# Patient Record
(demographics unavailable — no encounter records)

---

## 2024-10-08 NOTE — ASSESSMENT
[FreeTextEntry1] : MILTON ANDERSON is a 46 year female who presents today Oct 08, 2024 for the following issues:   HTN - non compliant with medication. Discussed potential consequence of untreated like pressure like heart attack or stroke.  - Must resume Nifedipine ER 60mg and HCTXZ 25 - Continue follow up with cardiology  Upper abdomen pain that is relived by eating foods - Possible duodenal ulcer ? - will r/o HPLORI  - start PPI . Stop any NSAIDS  Elevated CRP/ESR - RA and GELA negative - will repeat. If still elevated then refer to rheum

## 2024-10-08 NOTE — HISTORY OF PRESENT ILLNESS
[FreeTextEntry1] : pt presents for follow up [de-identified] : MILTON ANDERSON is a 46 year female who presents today Oct 08, 2024 for follow up.   She has been experiencing upper abdomen x 3-4 weeks. She get pain in morning or when hungry. Pain is relieved by food.  She has not been taking her blood pressure medication. Last pill taken was last week.  Has a lot of general body pain (back, feets, knees)  No recent fevers.

## 2024-10-08 NOTE — REVIEW OF SYSTEMS
[Fever] : no fever [Chills] : no chills [Fatigue] : fatigue [Chest Pain] : no chest pain [Palpitations] : no palpitations [Shortness Of Breath] : no shortness of breath [Wheezing] : no wheezing [Cough] : no cough [Abdominal Pain] : abdominal pain [Nausea] : no nausea [Constipation] : no constipation [Joint Pain] : joint pain [Back Pain] : back pain

## 2024-12-23 NOTE — PHYSICAL EXAM
[TextEntry] : GENERAL: Appears in no acute distress  HEENT: EOMI, PERRLA. No conjunctival erythema. Moist mucous membranes. No nasopharyngeal ulcers  NECK: Supple, no cervical lymphadenopathy, no thyromegaly  CARDIOVASCULAR: RRR. S1, S2 auscultated. No murmurs or rubs.  PULMONARY: Clear to auscultation b/l, no wheezes, rales, or crackles  ABDOMINAL: Soft, nontender, nondistended. Bowel sounds present. No organomegaly.  MSK:  No active synovitis, swelling, erythema, or warmth.  No joint tenderness to palpation.  No deformities.  SKIN: No lesions or rashes  NEURO: No focal deficits.  PSYCH: AAOx3. Normal affect and thought process.

## 2024-12-23 NOTE — HISTORY OF PRESENT ILLNESS
[FreeTextEntry1] : 47 y/o female w/ HTN presents as follow up for chronic fatigue and elevated ESR. Pt reports overall pains all over the body and fatigue x 2 years. Pt reports pains in the lower back. Pt had Hx of L knee meniscus repair surgery, pt advised she needs b/l meniscus repair surgery as well. Pt takes Tylenol PRN for the pains. Pt avoids NSAIDs due to HTN. 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 Pt was seen by me in 2023 for body pains and fatigue - advised on evaluation by neuro for hand and foot neuropathy symptoms with negative workup.  Pt referred back to rheumatology for elevated inflammatory markres. Normal/neg GELA, RF, Tick-borne diseases in 7/2024.  Pt reports recurrent fevers ~3/2024 - 7/2024 with resolution of fever after breast implant replacement due to rupture. Pt was seen by hem/onc for lymph node swelling with workup without any concerning findings.  WORKUP: ESR 44 (7/2024) -> 51 (10/2024) CRP 20 (7/2024) -> negative (10/2024)

## 2024-12-23 NOTE — ASSESSMENT
[FreeTextEntry1] : 47 y/o female w/ HTN presents as follow up for chronic fatigue and elevated ESR. Pt reports overall pains all over the body and fatigue x 2 years. Pt reports pains in the lower back. Pt had Hx of L knee meniscus repair surgery, pt advised she needs b/l meniscus repair surgery as well. Pt takes Tylenol PRN for the pains. Pt avoids NSAIDs due to HTN. 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 Pt was seen by me in 2023 for body pains and fatigue - advised on evaluation by neuro for hand and foot neuropathy symptoms with negative workup.  Pt referred back to rheumatology for elevated inflammatory markers. Normal/neg GELA, RF, Tick-borne diseases in 7/2024.  Pt reports recurrent fevers ~3/2024 - 7/2024 with resolution of fever after breast implant replacement due to rupture. Pt was seen by hem/onc for lymph node swelling with workup without any concerning findings.  Patient has elevated ESR, which is a non-specific marker that can be seen in systemic inflammation. However, this is not specific to systemic autoimmune diseases. Pt's body aches and fatigue are chronic, non-articular, without signs of inflammatory arthritis. GELA and RF are already negative. I can complete workup for systemic autoimmune diseases but I have low suspicion.  There are numerous causes of ESR elevation. It is possible that pt may be having inflammatory reaction (pathologic or not) to breast implants.  - Obtain labwork to complete workup for signs of systemic autoimmune diseases. - Rx meloxicam 7.5mg PO daily PRN. I advised that the NSAID should be taken with food.  In addition while taking the prescribed NSAID, no over the counter or other NSAIDs should be used, such as ibuprofen (Motrin or Advil) or naproxen (Aleve) as this can cause stomach upset or other side effects.  If needed for fever or breakthrough pain Tylenol can be used. - Patient advised on stress reduction, sleep hygiene, physical activity and exercise (including yoga and [place]), treatment of stresses, cognitive behavioral therapy. - Will contact pt with results of above workup. If unremarkable, pt does not need to follow up regularly with rheumatology. RTC PRN.

## 2024-12-23 NOTE — HISTORY OF PRESENT ILLNESS
[FreeTextEntry1] : 45 y/o female w/ HTN presents as follow up for chronic fatigue and elevated ESR. Pt reports overall pains all over the body and fatigue x 2 years. Pt reports pains in the lower back. Pt had Hx of L knee meniscus repair surgery, pt advised she needs b/l meniscus repair surgery as well. Pt takes Tylenol PRN for the pains. Pt avoids NSAIDs due to HTN. 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 Pt was seen by me in 2023 for body pains and fatigue - advised on evaluation by neuro for hand and foot neuropathy symptoms with negative workup.  Pt referred back to rheumatology for elevated inflammatory markres. Normal/neg GELA, RF, Tick-borne diseases in 7/2024.  Pt reports recurrent fevers ~3/2024 - 7/2024 with resolution of fever after breast implant replacement due to rupture. Pt was seen by hem/onc for lymph node swelling with workup without any concerning findings.  WORKUP: ESR 44 (7/2024) -> 51 (10/2024) CRP 20 (7/2024) -> negative (10/2024)

## 2025-01-16 NOTE — HISTORY OF PRESENT ILLNESS
[FreeTextEntry1] : Ms. MILTON ANDERSON is a 46 year old female with past medical history of bilateral breast implant capsular contracture who is now s/p implant removal, capsulectomy, and re-insertion of breast implants. Her most recent breast US was done 11/1/24. Results returned with "suspicion of right breast implant rupture." She then had an MRI completed with pre and post IV contrast 12/12/24. Results showed "right breast silicone breast implant intact with minimal lb-implant fluid noted. Left breast with intact breast implant and minimal lb-implant fluid."  She presents today for evaluation of her ongoing left breast pain. She explains that is keeps on improving as time goes on.

## 2025-01-16 NOTE — PHYSICAL EXAM
[NI] : Normal [de-identified] : bilateral incisions c/d/i soft and symmetrical implants in correct positioning  nipples viable no signs of infection noted [de-identified] : incisions c/d/i no signs of infection noted

## 2025-01-16 NOTE — ASSESSMENT
[FreeTextEntry1] : 47 yo female s/p b/l breast implant removal, capsulectomy, re-insertion of implants 7/3/24  Patient with left breast pain that has been improving. Her imaging results do not demonstrate implant rupture. Her exam is without any concerning findings. No surgical intervention is warranted at this time  Discussed continuing to perform scar massages RTO prn all pt questions answered.

## 2025-03-10 NOTE — PHYSICAL EXAM
[Normal] : normal rate, regular rhythm, normal S1 and S2 and no murmur heard [de-identified] : + mild epigastric tenderness

## 2025-03-10 NOTE — ASSESSMENT
[FreeTextEntry1] : 46 YOF with moderate ERNA, HTN, pre DM,   BMI 30 here today for the following issues  Epigastric pain x 5 months - No improvement with PPI - Yonatan order CT scan of Abdomen/Pelvis - Will refer to Gastroenterology   HTN - Uncontrolled and poor medication compliance. Advised the importance of taking medication very day and not skipping any doses.  - Continue Nifepeine ER 60mg and HCTZ 25mg   ERNA - Last sleep study Aug 2023: Moderate ERNA - Will refer to pulmonology for CPAP  Joint pain - Advised that taking Meloxicam can increase blood pressure and increase risk of bleeding ulcers. Advised to stop Meloxicam and take Tylenol only.   Obesity - BMI 30. Was prescribed Wegovy, but stopped taking it. Advised importance of high protein, high fiber, low fat, low carb diet. Increase walking. Implement 30 mins of exercise 5 times per week.   Pre DM - Advised diet, exercise, and weight loss  Thyroid nodule? Will order US thyroid    Follow up in 3-4 months

## 2025-03-10 NOTE — HISTORY OF PRESENT ILLNESS
[FreeTextEntry1] : Pt presents for a f/u from gastroenterology.  [de-identified] : 46 YOF with moderate ERNA, HTN, pre DM,   BMI 30 here today with complaints of burning epigastric pain x 5 months, and constipation x 5/6 days, but did have with small BM this morning.   She takes a laxative tea. She did a fleet anemia. She does not like to take MiraLAX because it makes her feel worse, and states it doesn't work. She was taking Omeprazole 20 mg, but stopped it a few weeks ago because it was not working. Patient states she has not been taking her Nifedipine, because she thought it was discontinued? And she only takes the HCTZ maybe 1-2 times a week. She was dx with moderate ERNA, but has not followed up with pulmonary for CPAP machine. She has very poor sleep habits. She works as overnight nurse. She states sometimes she only sleeps 2-3 hours during the day. On her days off from work .she will sleep 8 hours but does not feel deep sleep. She stopped Wegovy. She takes Meloxicam everyday, but states the epigastric pain started before she started Meloxicam.

## 2025-03-26 NOTE — DISCUSSION/SUMMARY
[EKG obtained to assist in diagnosis and management of assessed problem(s)] : EKG obtained to assist in diagnosis and management of assessed problem(s) [FreeTextEntry1] : Pt is a 47 y/o F PMH HTN, HLD, preDM, BMI 30.    TTE 09/2020 EF 59% without significant valvular disease ETT 09/2020 good exercise tolerance, no ischemic changes  HTN: improving c/w HCTZ 25mg qd c/w nifedipine 60mg qd Advised low salt diet, regular exercise, weight loss  HLD: Advised lifestyle modifications   preDM: Advise lifestyle modifications   Pt will return in 6-12 mnths or sooner as needed but I encouraged communication via phone or portal if necessary. The described plan was discussed with the pt.  All questions and concerns were addressed to the best of my knowledge.

## 2025-03-26 NOTE — HISTORY OF PRESENT ILLNESS
[FreeTextEntry1] : Pt is a 47 y/o F who presents today for f/u.  Pt has PMH HTN, preDM, BMI 30 family hx: mother MI 40 's, father "cardiac problems".    Pt reports feeling well and has no active cardiac complaints - denies CP, SOB, palpitations, dizziness, syncope, edema, orthopnea, PND, orthopnea.  No exertional symptoms.  She tells me that she recently stopped antiHTN but has restarted  TTE 09/2020 EF 59% without significant valvular disease ETT 09/2020 good exercise tolerance, no ischemic changes Home sleep study 10/2020 mild ERNA  Smoking status: never  social ETOH no drug use Current exercise: none Daily water intake:  "not much" Daily caffeine intake: 1 cup coffee OTC medications: none Family hx: mother MI 40 's, father "cardiac problems" Previous hospitalizations: never 3 children - GDM

## 2025-04-14 NOTE — PROCEDURE
[FreeTextEntry1] : Pulmonary function test 4/14/2025 mild restrictive disease possibly secondary obesity.  Mild obstructive small airways with a bronchodilator effect.

## 2025-04-14 NOTE — DISCUSSION/SUMMARY
[FreeTextEntry1] : Ms. Washington has a history of obstructive sleep apnea and she never went to start treatment.  She is having continued symptoms of witnessed apneas with significant daytime hypersomnolence.  She does work overnight shifts which makes it difficult to have a normal sleep pattern.  I recommend a home sleep study.  This been explained to the patient and based on these results further recommendations of will be made.  Patient agrees with the plan of care. The patient understands and agrees with plan of care. Today's office visit encompassed 46 minutes. I conducted an extensive history,physical exam and reviewed diagnosis and treatment options including diagnostic tests,radiology studies including cat scans and the use of prescription medication.

## 2025-04-14 NOTE — REASON FOR VISIT
[Initial] : an initial visit [Sleep Evaluation] : sleep evaluation [Sleep Apnea] : sleep apnea [TextEntry] : Patient here to establish care.  She was diagnosed with sleep apnea a few years ago.  The latest sleep study she had is from 2023.  Patient does not use a CPAP.  She is struggling with her sleep, the quality is poor, she is snoring, waking up gasping for air, she feels like she is choking in her sleep.

## 2025-04-14 NOTE — HISTORY OF PRESENT ILLNESS
[Never] : never [TextBox_4] : 46 female no hx tobacco  dx obstructive sleep apnea 2023  on home study and never machine Today presents bec PCP rec evaluation +snore +gasping choking and witness apnea +thrashing sl am headache and dry mouth feels tired dbut work overnite  1100pm-800 am  at [employer] [facility] no sob no cough no wheeze no chest pain no tightness

## 2025-05-16 NOTE — HISTORY OF PRESENT ILLNESS
[Never] : never [TextBox_4] : 46-year-old female presents today for follow-up of her sleep study.  Patient works the night shift but is often tired.  She tries to sleep during the day she often has thrashing about and always is tired.

## 2025-05-16 NOTE — REASON FOR VISIT
[Follow-Up] : a follow-up visit [Sleep Evaluation] : sleep evaluation [Hypersomnolence] : hypersomnolence [TextEntry] : Patient here for sleep study results. No pulmonary compliants.

## 2025-05-16 NOTE — PROCEDURE
[FreeTextEntry1] : Home sleep study 5/1/2025 AHI 14.9 wu O2 sat 73%. 151 events. This consistent with mild to moderate obstructive sleep apnea. Will discuss with patient next visit.

## 2025-05-16 NOTE — HISTORY OF PRESENT ILLNESS
[FreeTextEntry1] : Pt is a 47 y/o F who presents today for f/u.  Pt has PMH HTN, preDM, BMI 30 family hx: mother MI 40 's, father "cardiac problems".    She was seen today at pulmonologists office and HR was found to be in the 40's - pt felt some palpitations.  She denies CP, SOB, diaphoresis, dizziness, syncope, LE edema, PND, orthopnea.  Home 's/70's  TTE 09/2020 EF 59% without significant valvular disease ETT 09/2020 good exercise tolerance, no ischemic changes Home sleep study 10/2020 mild ERNA  Smoking status: never  social ETOH no drug use Current exercise: none Daily water intake:  "not much" Daily caffeine intake: 1 cup coffee OTC medications: none Family hx: mother MI 40 's, father "cardiac problems" Previous hospitalizations: never 3 children - GDM

## 2025-05-16 NOTE — DISCUSSION/SUMMARY
[FreeTextEntry1] : Felix has mild to moderate obstructive sleep apnea.  The patient has significant symptoms and I favor positive pressure breathing this been thorough explained to the patient she understands and agrees. discussed obstructive sleep apnea at length Pt advised weight loss Pt instructed not to use alcohol with in 4 hrs of sleep Pt instructed not to drive or use machinery if very tired The patient must use the APAP machine 70% of nights for at least 4 hours a night to be effective. They must use distilled water in humidifier. We will be checking patient compliance on their next visit will initiate apap therapy Patient understands and all questions answered  The patient has an irregular heartbeat.  Upon examination it seems like she has couple beats.  She is asymptomatic.  We spoke to her cardiologist who is going to evaluate her in the office now. The patient understands and agrees with the plan of care. Today's office visit encompassed 42 minutes. I conducted an extensive history, physical exam and reviewed diagnosis and treatment options including diagnostic tests radiology studies including cat scans and the use of prescription medication.

## 2025-05-16 NOTE — DISCUSSION/SUMMARY
[EKG obtained to assist in diagnosis and management of assessed problem(s)] : EKG obtained to assist in diagnosis and management of assessed problem(s) [FreeTextEntry1] : Pt is a 47 y/o F PMH HTN, HLD, preDM, BMI 30.   She p/w bradycardia - pt with possible premature beats check licea  TTE 09/2020 EF 59% without significant valvular disease ETT 09/2020 good exercise tolerance, no ischemic changes  HTN: still elevated c/w HCTZ 25mg qd increase nifedipine to 90mg qd Advised low salt diet, regular exercise, weight loss  HLD: Advised lifestyle modifications   preDM: Advise lifestyle modifications   Pt will return in 4-6 mnths or sooner as needed but I encouraged communication via phone or portal if necessary. The described plan was discussed with the pt.  All questions and concerns were addressed to the best of my knowledge.    Today's office visit encompassed 32 minutes which excludes teaching and separately reported services. I conducted an extensive history, physical exam and reviewed diagnosis and treatment options including diagnostic tests, radiology studies including cat scans and the use of prescription medication.

## 2025-05-16 NOTE — PHYSICAL EXAM
[Irregular rate/rhythm] : irregular rate/rhythm [No Acute Distress] : no acute distress [Normal Oropharynx] : normal oropharynx [Normal Appearance] : normal appearance [No Neck Mass] : no neck mass [Normal S1, S2] : normal s1, s2 [No Murmurs] : no murmurs [No Resp Distress] : no resp distress [Clear to Auscultation Bilaterally] : clear to auscultation bilaterally [No Abnormalities] : no abnormalities [Benign] : benign [Normal Gait] : normal gait [No Clubbing] : no clubbing [No Cyanosis] : no cyanosis [No Edema] : no edema [FROM] : FROM [Normal Color/ Pigmentation] : normal color/ pigmentation [No Focal Deficits] : no focal deficits [Oriented x3] : oriented x3 [Normal Affect] : normal affect [TextBox_54] : seems to have  ?coupled beats  ? pac

## 2025-07-08 NOTE — HISTORY OF PRESENT ILLNESS
[No] : Patient does not have concerns regarding sex [Currently Active] : currently active [Men] : men [Vaginal] : vaginal [TextBox_4] : Mirena IUD placed (per patient) 2022 after miscarriage (no note in chart). menses very light or nonexistent.  Implants replaced 1 yr ago  [Mammogramdate] : 4/2025 [PapSmeardate] : 1/2024 [ColonoscopyDate] : 7/2023

## 2025-07-08 NOTE — PHYSICAL EXAM
[Appropriately responsive] : appropriately responsive [Alert] : alert [No Acute Distress] : no acute distress [Soft] : soft [Non-tender] : non-tender [Examination Of The Breasts] : a normal appearance [] : implants [No Masses] : no breast masses were palpable [Labia Majora] : normal [Labia Minora] : normal [IUD String] : an IUD string was noted [Normal] : normal [Uterine Adnexae] : non-palpable [Tenderness] : nontender [Enlarged ___ wks] : not enlarged [Mass ___ cm] : no uterine mass was palpated [FreeTextEntry5] : pap done